# Patient Record
Sex: FEMALE | Race: WHITE
[De-identification: names, ages, dates, MRNs, and addresses within clinical notes are randomized per-mention and may not be internally consistent; named-entity substitution may affect disease eponyms.]

---

## 2021-01-19 ENCOUNTER — HOSPITAL ENCOUNTER (EMERGENCY)
Dept: HOSPITAL 95 - ER | Age: 66
LOS: 1 days | Discharge: HOME | End: 2021-01-20
Payer: MEDICARE

## 2021-01-19 VITALS — WEIGHT: 259.99 LBS | HEIGHT: 69 IN | BODY MASS INDEX: 38.51 KG/M2

## 2021-01-19 DIAGNOSIS — R06.02: ICD-10-CM

## 2021-01-19 DIAGNOSIS — Z88.6: ICD-10-CM

## 2021-01-19 DIAGNOSIS — Z88.8: ICD-10-CM

## 2021-01-19 DIAGNOSIS — Z85.3: ICD-10-CM

## 2021-01-19 DIAGNOSIS — K76.89: ICD-10-CM

## 2021-01-19 DIAGNOSIS — J84.10: ICD-10-CM

## 2021-01-19 DIAGNOSIS — R07.89: ICD-10-CM

## 2021-01-19 DIAGNOSIS — Z85.118: ICD-10-CM

## 2021-01-19 DIAGNOSIS — Z91.02: ICD-10-CM

## 2021-01-19 DIAGNOSIS — N39.0: ICD-10-CM

## 2021-01-19 DIAGNOSIS — M62.830: Primary | ICD-10-CM

## 2021-01-19 DIAGNOSIS — Z85.89: ICD-10-CM

## 2021-01-19 DIAGNOSIS — Z90.2: ICD-10-CM

## 2021-01-19 DIAGNOSIS — R05: ICD-10-CM

## 2021-01-19 DIAGNOSIS — Z20.822: ICD-10-CM

## 2021-01-19 DIAGNOSIS — I49.3: ICD-10-CM

## 2021-01-19 LAB
ALBUMIN SERPL BCP-MCNC: 3.7 G/DL (ref 3.4–5)
ALBUMIN/GLOB SERPL: 1 {RATIO} (ref 0.8–1.8)
ALT SERPL W P-5'-P-CCNC: 20 U/L (ref 12–78)
ANION GAP SERPL CALCULATED.4IONS-SCNC: 6 MMOL/L (ref 6–16)
AST SERPL W P-5'-P-CCNC: 14 U/L (ref 12–37)
BASOPHILS # BLD AUTO: 0.02 K/MM3 (ref 0–0.23)
BASOPHILS NFR BLD AUTO: 1 % (ref 0–2)
BILIRUB SERPL-MCNC: 1.6 MG/DL (ref 0.1–1)
BUN SERPL-MCNC: 19 MG/DL (ref 8–24)
CALCIUM SERPL-MCNC: 8.9 MG/DL (ref 8.5–10.1)
CHLORIDE SERPL-SCNC: 108 MMOL/L (ref 98–108)
CO2 SERPL-SCNC: 27 MMOL/L (ref 21–32)
CREAT SERPL-MCNC: 0.62 MG/DL (ref 0.4–1)
DEPRECATED RDW RBC AUTO: 41.9 FL (ref 35.1–46.3)
EOSINOPHIL # BLD AUTO: 0.06 K/MM3 (ref 0–0.68)
EOSINOPHIL NFR BLD AUTO: 1 % (ref 0–6)
ERYTHROCYTE [DISTWIDTH] IN BLOOD BY AUTOMATED COUNT: 12.4 % (ref 11.7–14.2)
GLOBULIN SER CALC-MCNC: 3.7 G/DL (ref 2.2–4)
GLUCOSE SERPL-MCNC: 162 MG/DL (ref 70–99)
HCT VFR BLD AUTO: 46.8 % (ref 33–51)
HGB BLD-MCNC: 16.2 G/DL (ref 11.5–16)
IMM GRANULOCYTES # BLD AUTO: 0.02 K/MM3 (ref 0–0.1)
IMM GRANULOCYTES NFR BLD AUTO: 1 % (ref 0–1)
LYMPHOCYTES # BLD AUTO: 1.41 K/MM3 (ref 0.84–5.2)
LYMPHOCYTES NFR BLD AUTO: 33 % (ref 21–46)
MCHC RBC AUTO-ENTMCNC: 34.6 G/DL (ref 31.5–36.5)
MCV RBC AUTO: 91 FL (ref 80–100)
MONOCYTES # BLD AUTO: 0.41 K/MM3 (ref 0.16–1.47)
MONOCYTES NFR BLD AUTO: 10 % (ref 4–13)
NEUTROPHILS # BLD AUTO: 2.36 K/MM3 (ref 1.96–9.15)
NEUTROPHILS NFR BLD AUTO: 55 % (ref 41–73)
NRBC # BLD AUTO: 0 K/MM3 (ref 0–0.02)
NRBC BLD AUTO-RTO: 0 /100 WBC (ref 0–0.2)
PLATELET # BLD AUTO: 147 K/MM3 (ref 150–400)
POTASSIUM SERPL-SCNC: 3.7 MMOL/L (ref 3.5–5.5)
PROT SERPL-MCNC: 7.4 G/DL (ref 6.4–8.2)
SODIUM SERPL-SCNC: 141 MMOL/L (ref 136–145)
TROPONIN I SERPL-MCNC: <0.015 NG/ML (ref 0–0.04)

## 2021-01-19 PROCEDURE — A9270 NON-COVERED ITEM OR SERVICE: HCPCS

## 2021-01-20 LAB
LEUKOCYTE ESTERASE UR QL STRIP: (no result)
PROT UR STRIP-MCNC: (no result) MG/DL
RBC #/AREA URNS HPF: (no result) /HPF (ref 0–2)
SP GR SPEC: 1.02 (ref 1–1.02)
UROBILINOGEN UR STRIP-MCNC: (no result) MG/DL
WBC #/AREA URNS HPF: (no result) /HPF (ref 0–5)

## 2021-01-29 ENCOUNTER — HOSPITAL ENCOUNTER (EMERGENCY)
Dept: HOSPITAL 95 - ER | Age: 66
Discharge: HOME | End: 2021-01-29
Payer: MEDICARE

## 2021-01-29 VITALS — HEIGHT: 69 IN | WEIGHT: 270 LBS | BODY MASS INDEX: 39.99 KG/M2

## 2021-01-29 DIAGNOSIS — Z79.899: ICD-10-CM

## 2021-01-29 DIAGNOSIS — Z88.8: ICD-10-CM

## 2021-01-29 DIAGNOSIS — S39.012A: Primary | ICD-10-CM

## 2021-01-29 DIAGNOSIS — R10.11: ICD-10-CM

## 2021-01-29 DIAGNOSIS — Z88.6: ICD-10-CM
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
## 2021-01-29 LAB
ALBUMIN SERPL BCP-MCNC: 3.8 G/DL (ref 3.4–5)
ALBUMIN/GLOB SERPL: 1 {RATIO} (ref 0.8–1.8)
ALT SERPL W P-5'-P-CCNC: 20 U/L (ref 12–78)
ANION GAP SERPL CALCULATED.4IONS-SCNC: 5 MMOL/L (ref 6–16)
AST SERPL W P-5'-P-CCNC: 14 U/L (ref 12–37)
BASOPHILS # BLD AUTO: 0.02 K/MM3 (ref 0–0.23)
BASOPHILS NFR BLD AUTO: 0 % (ref 0–2)
BILIRUB SERPL-MCNC: 2.3 MG/DL (ref 0.1–1)
BUN SERPL-MCNC: 18 MG/DL (ref 8–24)
CALCIUM SERPL-MCNC: 9.4 MG/DL (ref 8.5–10.1)
CHLORIDE SERPL-SCNC: 106 MMOL/L (ref 98–108)
CO2 SERPL-SCNC: 30 MMOL/L (ref 21–32)
CREAT SERPL-MCNC: 0.76 MG/DL (ref 0.4–1)
DEPRECATED RDW RBC AUTO: 43.4 FL (ref 35.1–46.3)
EOSINOPHIL # BLD AUTO: 0.05 K/MM3 (ref 0–0.68)
EOSINOPHIL NFR BLD AUTO: 1 % (ref 0–6)
ERYTHROCYTE [DISTWIDTH] IN BLOOD BY AUTOMATED COUNT: 12.5 % (ref 11.7–14.2)
GLOBULIN SER CALC-MCNC: 3.8 G/DL (ref 2.2–4)
GLUCOSE SERPL-MCNC: 146 MG/DL (ref 70–99)
HCT VFR BLD AUTO: 48.5 % (ref 33–51)
HGB BLD-MCNC: 16.4 G/DL (ref 11.5–16)
IMM GRANULOCYTES # BLD AUTO: 0.01 K/MM3 (ref 0–0.1)
IMM GRANULOCYTES NFR BLD AUTO: 0 % (ref 0–1)
LEUKOCYTE ESTERASE UR QL STRIP: (no result)
LYMPHOCYTES # BLD AUTO: 1.52 K/MM3 (ref 0.84–5.2)
LYMPHOCYTES NFR BLD AUTO: 30 % (ref 21–46)
MCHC RBC AUTO-ENTMCNC: 33.8 G/DL (ref 31.5–36.5)
MCV RBC AUTO: 94 FL (ref 80–100)
MONOCYTES # BLD AUTO: 0.49 K/MM3 (ref 0.16–1.47)
MONOCYTES NFR BLD AUTO: 10 % (ref 4–13)
NEUTROPHILS # BLD AUTO: 2.94 K/MM3 (ref 1.96–9.15)
NEUTROPHILS NFR BLD AUTO: 59 % (ref 41–73)
NRBC # BLD AUTO: 0 K/MM3 (ref 0–0.02)
NRBC BLD AUTO-RTO: 0 /100 WBC (ref 0–0.2)
PLATELET # BLD AUTO: 134 K/MM3 (ref 150–400)
POTASSIUM SERPL-SCNC: 4.1 MMOL/L (ref 3.5–5.5)
PROT SERPL-MCNC: 7.6 G/DL (ref 6.4–8.2)
PROT UR STRIP-MCNC: (no result) MG/DL
RBC #/AREA URNS HPF: (no result) /HPF (ref 0–2)
SODIUM SERPL-SCNC: 141 MMOL/L (ref 136–145)
SP GR SPEC: 1.02 (ref 1–1.02)
UROBILINOGEN UR STRIP-MCNC: (no result) MG/DL
WBC #/AREA URNS HPF: (no result) /HPF (ref 0–5)

## 2021-01-29 PROCEDURE — A9270 NON-COVERED ITEM OR SERVICE: HCPCS

## 2021-05-25 ENCOUNTER — HOSPITAL ENCOUNTER (OUTPATIENT)
Dept: HOSPITAL 95 - ORSCMMR | Age: 66
LOS: 2 days | Discharge: HOME | End: 2021-05-27
Attending: ORTHOPAEDIC SURGERY
Payer: MEDICARE

## 2021-05-25 VITALS — BODY MASS INDEX: 40.76 KG/M2 | WEIGHT: 268.96 LBS | HEIGHT: 68 IN

## 2021-05-25 DIAGNOSIS — M79.7: ICD-10-CM

## 2021-05-25 DIAGNOSIS — M17.11: Primary | ICD-10-CM

## 2021-05-25 DIAGNOSIS — K21.9: ICD-10-CM

## 2021-05-25 DIAGNOSIS — Z79.899: ICD-10-CM

## 2021-05-25 DIAGNOSIS — E66.01: ICD-10-CM

## 2021-05-25 PROCEDURE — 27447 TOTAL KNEE ARTHROPLASTY: CPT

## 2021-05-25 PROCEDURE — C1776 JOINT DEVICE (IMPLANTABLE): HCPCS

## 2021-05-25 PROCEDURE — A9270 NON-COVERED ITEM OR SERVICE: HCPCS

## 2021-05-25 NOTE — NUR
SHIFT SUMMARY
PT IS POD#0 FROM R TKA WITH DR. DIEGO.  PAIN HAS BEEN MANAGED WITH TYLENOL,
TORADOL AND OXYCODONE.  PT HAD EBL OF 500ML AND HAS A HEMOVAC PRESENT TO LEFT
KNEE.  130ML OF SEROUS FLUID EMPTIED FROM DRAIN POST-OP.  PT HAS BEEN OOB TO
THE CHAIR.  PT HAS VOIDED.  PT TOLERATING PO BUT HAS COMPLAINED OF NAUSEA.  PT
REPORTS CHRONIC NAUSEA AT BASELINE.  VSS.  WILL MONITOR UNTIL REPORT TO NOC
RN.

## 2021-05-26 LAB
ANION GAP SERPL CALCULATED.4IONS-SCNC: 5 MMOL/L (ref 6–16)
BASOPHILS # BLD AUTO: 0.02 K/MM3 (ref 0–0.23)
BASOPHILS NFR BLD AUTO: 0 % (ref 0–2)
BUN SERPL-MCNC: 21 MG/DL (ref 8–24)
CALCIUM SERPL-MCNC: 7.9 MG/DL (ref 8.5–10.1)
CHLORIDE SERPL-SCNC: 104 MMOL/L (ref 98–108)
CO2 SERPL-SCNC: 27 MMOL/L (ref 21–32)
CREAT SERPL-MCNC: 0.76 MG/DL (ref 0.4–1)
DEPRECATED RDW RBC AUTO: 40.3 FL (ref 35.1–46.3)
EOSINOPHIL # BLD AUTO: 0.03 K/MM3 (ref 0–0.68)
EOSINOPHIL NFR BLD AUTO: 0 % (ref 0–6)
ERYTHROCYTE [DISTWIDTH] IN BLOOD BY AUTOMATED COUNT: 11.9 % (ref 11.7–14.2)
GLUCOSE SERPL-MCNC: 156 MG/DL (ref 70–99)
HCT VFR BLD AUTO: 34.4 % (ref 33–51)
HGB BLD-MCNC: 11.9 G/DL (ref 11.5–16)
IMM GRANULOCYTES # BLD AUTO: 0.02 K/MM3 (ref 0–0.1)
IMM GRANULOCYTES NFR BLD AUTO: 0 % (ref 0–1)
LYMPHOCYTES # BLD AUTO: 1.28 K/MM3 (ref 0.84–5.2)
LYMPHOCYTES NFR BLD AUTO: 18 % (ref 21–46)
MCHC RBC AUTO-ENTMCNC: 34.6 G/DL (ref 31.5–36.5)
MCV RBC AUTO: 93 FL (ref 80–100)
MONOCYTES # BLD AUTO: 0.63 K/MM3 (ref 0.16–1.47)
MONOCYTES NFR BLD AUTO: 9 % (ref 4–13)
NEUTROPHILS # BLD AUTO: 5.03 K/MM3 (ref 1.96–9.15)
NEUTROPHILS NFR BLD AUTO: 72 % (ref 41–73)
NRBC # BLD AUTO: 0 K/MM3 (ref 0–0.02)
NRBC BLD AUTO-RTO: 0 /100 WBC (ref 0–0.2)
PLATELET # BLD AUTO: 139 K/MM3 (ref 150–400)
POTASSIUM SERPL-SCNC: 4 MMOL/L (ref 3.5–5.5)
SODIUM SERPL-SCNC: 136 MMOL/L (ref 136–145)

## 2021-05-26 NOTE — NUR
HEMOVAC/DC
PT HAS CONTINUED TO HAVE MODERATE AMT DRAINAGE FROM HEMOVAC. PROVIDER UPDATED;
PLAN NOW IS FOR PT TO STAY TONIGHT TO MONITOR HEMOVAC OUPUT AND DC HOME
TOMORROW AFTER ONE MORE SESSION WITH THERAPY. PT AND FAMILY AGREEABLE TO THIS.

## 2021-05-26 NOTE — NUR
SHIFT SUMMARY
POD1 R TKA, A/O X4, VSS, TOLERATING PO, AMBULATING, VOIDING WELL, PAIN WELL
MANAGED PER EMAR, NO ACUTE EVENTS THIS SHIFT. CALL LIGHT IN REACH, WILL
CONTINUE TO MONITOR AND REPORT TO ONCOMING DAY RN.

## 2021-05-26 NOTE — NUR
SHIFT SUMMARY
PT HAS BEEN A/O X4. SBA WITH FWW AND GAIT BELT, AMBULATING AND TOLERATING
WELL. PT WORKED WITH THERAPY TWICE TODAY, ONCE WITH GRANDDAUGHTER WHO WILL BE
HELPING HER AT HOME. PAIN DID INCREASE T/O THE DAY, REQUIRING ONE DOSE OF
0.5MG DILAUDED, WHICH HAS BEEN MANAGING PAIN WELL.  SHE REPORTED THAT PAIN
MEDS HAVE BEEN MAKING HER NAUSEOUS; MEDICATED PRN FOR NAUSEA.  PROVIDER WAS IN
TO SEE PT THIS AM.  DISCUSSED OUTPUT FROM HEMOVAC;  PLAN IS TO STAY THE NIGHT
AND WATCH HEMOVAC OUTPUT, WORK WITH THERAPY IN AM AND THEN DC HOME. PT
AGREEABLE TO THIS. PT HAS BEEN UP IN CHAIR MOST OF THE DAY; CURRENTLY RESTING
IN CHAIR WITH LEGS ELEVATED AND ICE PACK ON.

## 2021-05-27 NOTE — NUR
A/OX4. VSS ON RA. PAIN MANAGED WELL W/ OXYCODONE. PT NEEDING IV DILAUDID FOR
BREAK THROUGH PAIN AFTER AMBULATING TO TOILET
IN AM. PT UP TO TOILET W/ FWW AND SBA.
SLEEPING B/W CARE.

## 2021-05-27 NOTE — NUR
DISCHARGE
PT IS A/O X4. GRANDDAUGHTER AT BEDSIDE FOR DC INSTRUCTIONS. PT CLEARED
PHYSICAL THERAPY. NO DRAINAGE ON DRESSING FROM HEMOVAC REMOVAL. SENT HOME WITH
EXTRA SUPPLIES AND AQUACEL DRESSINGS. DC INSTRUCTIONS GIVEN TO PT AND FAMILY,
BOTH REPORT UNDERSTANDING AND HAVE NO QUESTIONS OR CONCERNS. PT SENT HOME WITH
BELONGINGS, POLAR PACK, DRESSING SUPPLIES, AND SCRIPTS. IV DC'D. PT DC'D AT
1145, ESCORTED TO CAR VIA WHEELCHAIR.

## 2021-09-16 ENCOUNTER — HOSPITAL ENCOUNTER (OUTPATIENT)
Dept: HOSPITAL 95 - LAB | Age: 66
Discharge: HOME | End: 2021-09-16
Attending: FAMILY MEDICINE
Payer: MEDICARE

## 2021-09-16 DIAGNOSIS — N39.0: Primary | ICD-10-CM

## 2021-11-03 ENCOUNTER — HOSPITAL ENCOUNTER (OUTPATIENT)
Dept: HOSPITAL 95 - ORSCMMR | Age: 66
Discharge: HOME | End: 2021-11-03
Attending: INTERNAL MEDICINE
Payer: MEDICARE

## 2021-11-03 VITALS — WEIGHT: 249.78 LBS | HEIGHT: 68 IN | BODY MASS INDEX: 37.86 KG/M2

## 2021-11-03 DIAGNOSIS — Z79.82: ICD-10-CM

## 2021-11-03 DIAGNOSIS — E66.01: ICD-10-CM

## 2021-11-03 DIAGNOSIS — K21.9: Primary | ICD-10-CM

## 2021-11-03 DIAGNOSIS — Z79.899: ICD-10-CM

## 2021-11-03 DIAGNOSIS — E78.00: ICD-10-CM

## 2021-11-03 DIAGNOSIS — Z85.118: ICD-10-CM

## 2021-11-03 DIAGNOSIS — K20.90: ICD-10-CM

## 2021-11-03 DIAGNOSIS — Z85.3: ICD-10-CM

## 2021-11-03 DIAGNOSIS — K22.2: ICD-10-CM

## 2021-11-03 DIAGNOSIS — Z85.89: ICD-10-CM

## 2021-11-03 PROCEDURE — 0D758ZZ DILATION OF ESOPHAGUS, VIA NATURAL OR ARTIFICIAL OPENING ENDOSCOPIC: ICD-10-PCS | Performed by: INTERNAL MEDICINE

## 2021-11-03 PROCEDURE — C1726 CATH, BAL DIL, NON-VASCULAR: HCPCS

## 2021-11-03 PROCEDURE — A9270 NON-COVERED ITEM OR SERVICE: HCPCS

## 2021-11-03 PROCEDURE — 0DB58ZX EXCISION OF ESOPHAGUS, VIA NATURAL OR ARTIFICIAL OPENING ENDOSCOPIC, DIAGNOSTIC: ICD-10-PCS | Performed by: INTERNAL MEDICINE

## 2021-11-03 PROCEDURE — 0DB48ZX EXCISION OF ESOPHAGOGASTRIC JUNCTION, VIA NATURAL OR ARTIFICIAL OPENING ENDOSCOPIC, DIAGNOSTIC: ICD-10-PCS | Performed by: INTERNAL MEDICINE

## 2021-11-03 PROCEDURE — 0DB78ZX EXCISION OF STOMACH, PYLORUS, VIA NATURAL OR ARTIFICIAL OPENING ENDOSCOPIC, DIAGNOSTIC: ICD-10-PCS | Performed by: INTERNAL MEDICINE

## 2021-11-03 NOTE — NUR
Discharge instructions reviewed with patient. Patient verbalizes understanding.
Copy given to patient to take home.
Discharged via wheelchair to private car for ride home.
Patient States Post-Procedure ride home has been arranged.

## 2021-11-03 NOTE — NUR
11/03/21 0942 Cassidy Lizarraga
History, Chart, Medications and Allergies reviewed before start of
procedure. Patient confirms NPO status and agrees with scheduled
surgery. 3-LEAD EKG REVIEWED WITH PHYSICIAN PRIOR TO START OF
PROCEDURE. MONITOR INTACT WITH CONTINUOUS PULSE OXIMETRY AND
INTERMITTENT BP. PATIENT DETERMINED TO BE ASA APPROPRIATE FOR
PROPOFOL SEDATION PRIOR TO START OF PROCEDURE BY . Bite Block
Placed AND REMOVED AT END OF CASE.

## 2022-08-02 ENCOUNTER — HOSPITAL ENCOUNTER (OUTPATIENT)
Dept: HOSPITAL 95 - LAB SHORT | Age: 67
Discharge: HOME | End: 2022-08-02
Attending: FAMILY MEDICINE
Payer: MEDICARE

## 2022-08-02 DIAGNOSIS — E11.59: Primary | ICD-10-CM

## 2022-08-02 LAB
CREAT UR-MCNC: 201 MG/DL (ref 27–270)
MICROALBUMIN UR-MCNC: 104 MG/L (ref 0–20)
MICROALBUMIN/CREAT UR: 51.74 MG/G (ref 0–30)

## 2022-08-24 ENCOUNTER — HOSPITAL ENCOUNTER (OUTPATIENT)
Dept: HOSPITAL 95 - PLD | Age: 67
Discharge: HOME | End: 2022-08-24
Attending: PHYSICIAN ASSISTANT
Payer: MEDICARE

## 2022-08-24 DIAGNOSIS — L82.1: Primary | ICD-10-CM
